# Patient Record
Sex: MALE | Race: WHITE | NOT HISPANIC OR LATINO | Employment: OTHER | URBAN - METROPOLITAN AREA
[De-identification: names, ages, dates, MRNs, and addresses within clinical notes are randomized per-mention and may not be internally consistent; named-entity substitution may affect disease eponyms.]

---

## 2017-12-01 PROBLEM — E89.0 POSTOPERATIVE HYPOTHYROIDISM: Status: ACTIVE | Noted: 2017-12-01

## 2017-12-01 PROBLEM — I10 HYPERTENSION, ESSENTIAL: Status: ACTIVE | Noted: 2017-12-01

## 2018-08-31 PROBLEM — S89.90XA INJURY OF KNEE: Status: ACTIVE | Noted: 2018-08-31

## 2018-08-31 PROBLEM — M25.569 KNEE PAIN: Status: ACTIVE | Noted: 2018-08-31

## 2018-08-31 PROBLEM — M19.019 ACROMIOCLAVICULAR JOINT ARTHRITIS: Status: ACTIVE | Noted: 2018-08-31

## 2018-08-31 PROBLEM — M17.9 OSTEOARTHRITIS OF KNEE: Status: ACTIVE | Noted: 2018-08-31

## 2018-08-31 PROBLEM — IMO0002 CURRENT TEAR OF MEDIAL CARTILAGE OR MENISCUS OF KNEE: Status: ACTIVE | Noted: 2018-08-31

## 2021-08-10 ENCOUNTER — TELEPHONE (OUTPATIENT)
Dept: FAMILY MEDICINE CLINIC | Facility: CLINIC | Age: 86
End: 2021-08-10

## 2021-08-10 NOTE — TELEPHONE ENCOUNTER
Spoke with this patient in detail. Advised him that I do not have any thing before the date that he was given. Pt is being placed on a wait list and if an appt comes avail he will be notified.

## 2021-08-10 NOTE — TELEPHONE ENCOUNTER
Caller: Florentin Mcnamara    Relationship to patient: Self    Best call back number: [phone]    Patient is needing: PATIENT IS OFFBOARDING DR. MIN. HE ASKED IF THERE IS A WAY HE COULD BE SEEN BY DR. HOLLY EARLIER THAN 12/6/21. HE SAID THAT HIS HEART SPECIALISTS IS REQUIRING HIM TO HAVE PRIMARY CARE PROVIDER BEFORE HE COULD SEE THEM AGAIN.     PLEASE ASSIST. PATIENT CAN BE REACHED [phone].

## 2021-08-14 PROBLEM — N28.9 RENAL INSUFFICIENCY: Status: ACTIVE | Noted: 2021-08-14

## 2021-08-17 PROBLEM — J44.9 COPD WITHOUT EXACERBATION: Status: ACTIVE | Noted: 2021-08-17

## 2021-09-02 ENCOUNTER — TELEPHONE (OUTPATIENT)
Dept: FAMILY MEDICINE CLINIC | Facility: CLINIC | Age: 86
End: 2021-09-02

## 2021-09-02 NOTE — TELEPHONE ENCOUNTER
Caller: [facility]    Relationship to patient: Other    Best call back number: [phone]    Patient is needing: MARK CALLED TO KNOW IF THE PATIENT HAS HAD A WALKING TEST TO MAKE SURE HE QUALIFIES FOR THE SUPPLIES. THEY SAID IT IS A REQUIREMENT FROM THE PATIENT'S INSURANCE. PLEASE CALL AND ADVISE.

## 2021-09-03 NOTE — TELEPHONE ENCOUNTER
Put it on your keyboard. Done 4/2021. He had desat to 87%. You can call them or fax it to them. Thanks.

## 2021-09-03 NOTE — TELEPHONE ENCOUNTER
Need fax number. If this facility calls back please obtain correct fax number.       Ok for [facility] to obtain.

## 2021-09-30 PROBLEM — J96.01 ACUTE RESPIRATORY FAILURE WITH HYPOXIA: Status: ACTIVE | Noted: 2021-09-30

## 2021-10-01 PROBLEM — J44.0 COPD WITH LOWER RESPIRATORY INFECTION: Status: ACTIVE | Noted: 2021-10-01

## 2022-06-17 ENCOUNTER — TELEPHONE (OUTPATIENT)
Dept: ONCOLOGY | Facility: CLINIC | Age: 87
End: 2022-06-17

## 2022-06-17 NOTE — TELEPHONE ENCOUNTER
Caller: Florentin Mcnamara    Relationship: Self    Best call back number: [phone]    What is the best time to reach you: ANYTIME    Who are you requesting to speak with (clinical staff, provider,  specific staff member): SCHEDULING    What was the call regarding: PT CALLED TO R/S THE SCANS HE MISSED - HIS CT SCANS AND US HAVE BEEN R/S FOR 6/21. PLEASE CALL PT TO R/S HIS LAB AND SCAN F/U APPT WITH DR VANCE.     Do you require a callback: YES

## 2023-05-25 ENCOUNTER — LAB (OUTPATIENT)
Dept: LAB | Facility: HOSPITAL | Age: 88
End: 2023-05-25
Payer: MEDICARE

## 2023-05-25 ENCOUNTER — HOSPITAL ENCOUNTER (OUTPATIENT)
Dept: CT IMAGING | Facility: HOSPITAL | Age: 88
Discharge: HOME | End: 2023-05-25
Payer: MEDICARE

## 2023-05-25 DIAGNOSIS — C77.9 SQUAMOUS CELL CARCINOMA OF LYMPH NODE: ICD-10-CM

## 2023-05-25 LAB
ALBUMIN SERPL-MCNC: 4.2 G/DL (ref 3.5–5.2)
ALBUMIN/GLOB SERPL: 1.6 G/DL
ALP SERPL-CCNC: 79 U/L (ref 39–117)
ALT SERPL W P-5'-P-CCNC: 33 U/L (ref 1–41)
ANION GAP SERPL CALCULATED.3IONS-SCNC: 10 MMOL/L (ref 5–15)
AST SERPL-CCNC: 44 U/L (ref 1–40)
BASOPHILS # BLD AUTO: 0.03 10*3/MM3 (ref 0–0.2)
BASOPHILS NFR BLD AUTO: 0.5 % (ref 0–1.5)
BILIRUB SERPL-MCNC: 0.9 MG/DL (ref 0–1.2)
BUN SERPL-MCNC: 26 MG/DL (ref 8–23)
BUN/CREAT SERPL: 18.2 (ref 7–25)
CALCIUM SPEC-SCNC: 8.9 MG/DL (ref 8.6–10.5)
CHLORIDE SERPL-SCNC: 105 MMOL/L (ref 98–107)
CO2 SERPL-SCNC: 24 MMOL/L (ref 22–29)
CREAT SERPL-MCNC: 1.43 MG/DL (ref 0.76–1.27)
DEPRECATED RDW RBC AUTO: 55.9 FL (ref 37–54)
EGFRCR SERPLBLD CKD-EPI 2021: 47.7 ML/MIN/1.73
EOSINOPHIL # BLD AUTO: 0.11 10*3/MM3 (ref 0–0.4)
EOSINOPHIL NFR BLD AUTO: 2 % (ref 0.3–6.2)
ERYTHROCYTE [DISTWIDTH] IN BLOOD BY AUTOMATED COUNT: 14.9 % (ref 12.3–15.4)
GLOBULIN UR ELPH-MCNC: 2.6 GM/DL
GLUCOSE SERPL-MCNC: 109 MG/DL (ref 65–99)
HCT VFR BLD AUTO: 37.3 % (ref 37.5–51)
HGB BLD-MCNC: 12.6 G/DL (ref 13–17.7)
IMM GRANULOCYTES # BLD AUTO: 0.02 10*3/MM3 (ref 0–0.05)
IMM GRANULOCYTES NFR BLD AUTO: 0.4 % (ref 0–0.5)
LYMPHOCYTES # BLD AUTO: 1.09 10*3/MM3 (ref 0.7–3.1)
LYMPHOCYTES NFR BLD AUTO: 19.3 % (ref 19.6–45.3)
MCH RBC QN AUTO: 34.3 PG (ref 26.6–33)
MCHC RBC AUTO-ENTMCNC: 33.8 G/DL (ref 31.5–35.7)
MCV RBC AUTO: 101.6 FL (ref 79–97)
MONOCYTES # BLD AUTO: 0.49 10*3/MM3 (ref 0.1–0.9)
MONOCYTES NFR BLD AUTO: 8.7 % (ref 5–12)
NEUTROPHILS NFR BLD AUTO: 3.9 10*3/MM3 (ref 1.7–7)
NEUTROPHILS NFR BLD AUTO: 69.1 % (ref 42.7–76)
NRBC BLD AUTO-RTO: 0 /100 WBC (ref 0–0.2)
PLAT MORPH BLD: NORMAL
PLATELET # BLD AUTO: 73 10*3/MM3 (ref 140–450)
PMV BLD AUTO: 11 FL (ref 6–12)
POTASSIUM SERPL-SCNC: 4.9 MMOL/L (ref 3.5–5.2)
PROT SERPL-MCNC: 6.8 G/DL (ref 6–8.5)
RBC # BLD AUTO: 3.67 10*6/MM3 (ref 4.14–5.8)
RBC MORPH BLD: NORMAL
SODIUM SERPL-SCNC: 139 MMOL/L (ref 136–145)
WBC MORPH BLD: NORMAL
WBC NRBC COR # BLD: 5.64 10*3/MM3 (ref 3.4–10.8)

## 2023-05-25 PROCEDURE — 0 DIATRIZOATE MEGLUMINE & SODIUM PER 1 ML: Performed by: INTERNAL MEDICINE

## 2023-05-25 PROCEDURE — 85007 BL SMEAR W/DIFF WBC COUNT: CPT

## 2023-05-25 PROCEDURE — 80053 COMPREHEN METABOLIC PANEL: CPT | Performed by: INTERNAL MEDICINE

## 2023-05-25 PROCEDURE — 25510000001 IOPAMIDOL 61 % SOLUTION: Performed by: INTERNAL MEDICINE

## 2023-05-25 PROCEDURE — 36415 COLL VENOUS BLD VENIPUNCTURE: CPT

## 2023-05-25 PROCEDURE — 70491 CT SOFT TISSUE NECK W/DYE: CPT

## 2023-05-25 PROCEDURE — 74177 CT ABD & PELVIS W/CONTRAST: CPT

## 2023-05-25 PROCEDURE — 82565 ASSAY OF CREATININE: CPT

## 2023-05-25 PROCEDURE — 71260 CT THORAX DX C+: CPT

## 2023-05-25 PROCEDURE — 85025 COMPLETE CBC W/AUTO DIFF WBC: CPT

## 2023-05-25 RX ADMIN — IOPAMIDOL 100 ML: 612 INJECTION, SOLUTION INTRAVENOUS at 11:32

## 2023-05-25 RX ADMIN — DIATRIZOATE MEGLUMINE AND DIATRIZOATE SODIUM 30 ML: 660; 100 LIQUID ORAL; RECTAL at 10:25

## 2023-05-26 LAB — CREAT BLDA-MCNC: 1.3 MG/DL (ref 0.6–1.3)

## 2023-05-26 RX ORDER — BUSPIRONE HYDROCHLORIDE 5 MG/1
TABLET ORAL
Qty: 60 TABLET | Refills: 1 | OUTPATIENT
Start: 2023-05-26

## 2023-05-30 RX ORDER — BUSPIRONE HYDROCHLORIDE 7.5 MG/1
TABLET ORAL
Qty: 90 TABLET | Refills: 0 | OUTPATIENT
Start: 2023-05-30

## 2023-05-31 NOTE — PROGRESS NOTES
.     REASON FOR FOLLOWUP :   Squamous cell carcinoma    HISTORY OF PRESENT ILLNESS:  The patient is a 88 y.o. year old male  who is here for follow-up with the above-mentioned history.    He fell recently and has a flare of his sciatica.   Per his request, this was a telephone visit instead of an in office visit.    No new problems.  States he had been on oral iron for about a year but self stopped this a month ago just because he ran out of the tablets.  Iron labs are robust today despite some mild anemia.  Therefore, he will remain off oral iron.    No new problems.  No bleeding.  Planning to move to [place] in about a week    Past Medical History:   Diagnosis Date   • Abdominal aneurysm     CT 6/2019, Dr Mcdaniel following   • Anemia    • Arthritis    • Asthma    • CHF (congestive heart failure)    • COPD (chronic obstructive pulmonary disease)    • DUMONT (dyspnea on exertion)    • Essential tremor    • GERD (gastroesophageal reflux disease)    • H/O asbestosis    • Heartburn    • Hyperlipidemia     not on therapy for several months   • Hypertension    • Hypothyroidism, postsurgical    • Insomnia    • Obesity    • Occasional tremors    • On supplemental oxygen therapy     2L NC @ HS PRN    • Pneumonia due to COVID-19 virus 10/2021   • Squamous cell carcinoma of lymph node    • Thrombocytopenia 10/02/2021   • Thyroid nodule     benign and has partial thyroidectomy   • Tremor      Past Surgical History:   Procedure Laterality Date   • ARTERIOGRAM AORTIC N/A 4/8/2021    Procedure: ZENITH AORTIC STENT GRAFT;  Surgeon: Yudy Mcdaniel Jr., MD;  Location: [facility] OR 18/19;  Service: Vascular;  Laterality: N/A;   • BACK SURGERY      2 surgeries   • CHOLECYSTECTOMY     • COLONOSCOPY N/A 2010   • EXCISION MASS ARM Right 12/24/2021    Procedure: EXCISION MASS UPPER EXTREMITY/AXILLA;  Surgeon: Jean Troy MD;  Location: [facility] OR [facility];  Service: General;  Laterality: Right;   • LYMPH NODE BIOPSY Right 2021     rt axilla   • REPLACEMENT TOTAL KNEE Right    • THYROIDECTOMY, PARTIAL     • TOTAL KNEE ARTHROPLASTY Left 10/23/2019    Procedure: TOTAL KNEE ARTHROPLASTY AND ALL ASSOCIATED PROCEDURES;  Surgeon: Gerardo Watts MD;  Location: [facility];  Service: Orthopedics       MEDICATIONS    Current Outpatient Medications:   •  acetaminophen-codeine (TYLENOL #4) 300-60 MG per tablet, Take 1 tablet by mouth Daily As Needed for Moderate Pain. PRN, Disp: 90 tablet, Rfl: 0  •  albuterol sulfate  (90 Base) MCG/ACT inhaler, Inhale 2 puffs Every 4 (Four) Hours As Needed for Wheezing., Disp: 18 g, Rfl: 2  •  busPIRone (BUSPAR) 7.5 MG tablet, Take 1 tablet by mouth 3 (Three) Times a Day., Disp: 90 tablet, Rfl: 0  •  celecoxib (CeleBREX) 200 MG capsule, TAKE 1 CAPSULE BY MOUTH DAILY, Disp: 90 capsule, Rfl: 1  •  ciprofloxacin (CIPRO) 500 MG tablet, Take 1 tablet by mouth 2 (Two) Times a Day., Disp: 14 tablet, Rfl: 0  •  fluticasone (FLONASE) 50 MCG/ACT nasal spray, USE 2 SPRAYS IN EACH NOSTRIL DAILY AS DIRECTED, Disp: 16 g, Rfl: 3  •  ipratropium-albuterol (DUO-NEB) 0.5-2.5 mg/3 ml nebulizer, Take 3 mL by nebulization Every 4 (Four) Hours As Needed for Wheezing or Shortness of Air., Disp: 360 mL, Rfl: 1  •  levothyroxine (SYNTHROID, LEVOTHROID) 88 MCG tablet, TAKE 1 TABLET BY MOUTH DAILY, Disp: 90 tablet, Rfl: 3  •  lisinopril (PRINIVIL,ZESTRIL) 20 MG tablet, TAKE 1 TABLET BY MOUTH DAILY, Disp: 90 tablet, Rfl: 1  •  meclizine (ANTIVERT) 12.5 MG tablet, Take 1 tablet by mouth 3 (Three) Times a Day As Needed for Dizziness., Disp: 60 tablet, Rfl: 0  •  metroNIDAZOLE (METROGEL) 0.75 % gel, Apply  topically to the appropriate area as directed 2 (Two) Times a Day., Disp: 45 g, Rfl: 1  •  O2 (OXYGEN), Inhale 2 L/min Continuous. Using nightly only on concentrator, Disp: , Rfl:   •  omeprazole (priLOSEC) 40 MG capsule, Take 1 capsule by mouth Daily., Disp: 90 capsule, Rfl: 3  •  propranolol (INDERAL) 10 MG tablet, Take 1 tablet by  "mouth 2 (Two) Times a Day., Disp: 90 tablet, Rfl: 1  •  spironolactone (ALDACTONE) 25 MG tablet, Take 0.5 tablets by mouth Daily., Disp: 30 tablet, Rfl: 11  •  Stiolto Respimat 2.5-2.5 MCG/ACT aerosol solution inhaler, INHALE 2 PUFFS BY MOUTH DAILY, Disp: 4 g, Rfl: 3    ALLERGIES:     Allergies   Allergen Reactions   • Percocet [Oxycodone-Acetaminophen] Hallucinations       SOCIAL HISTORY:       Social History     Socioeconomic History   • Marital status:      Spouse name: Denisse   Tobacco Use   • Smoking status: Former     Packs/day: 1.00     Years: 20.00     Pack years: 20.00     Types: Cigarettes     Quit date: 9/10/1989     Years since quittin.7   • Smokeless tobacco: Never   Vaping Use   • Vaping Use: Never used   Substance and Sexual Activity   • Alcohol use: Not Currently     Comment: ocassionally   • Drug use: No   • Sexual activity: Not Currently     Partners: Female     Comment: I'm 87         FAMILY HISTORY:  Family History   Problem Relation Age of Onset   • Heart disease Mother    • Stroke Father    • Lung cancer Brother 80   • Malig Hyperthermia Neg Hx        REVIEW OF SYSTEMS:  Review of Systems   Constitutional: Negative for activity change.   HENT: Negative for nosebleeds and trouble swallowing.    Respiratory: Negative for shortness of breath and wheezing.    Cardiovascular: Negative for chest pain and palpitations.   Gastrointestinal: Negative for constipation, diarrhea and nausea.   Genitourinary: Negative for dysuria and hematuria.   Musculoskeletal: Negative for arthralgias and myalgias.   Neurological: Negative for seizures and syncope.   Hematological: Negative for adenopathy. Does not bruise/bleed easily.   Psychiatric/Behavioral: Negative for confusion.              Vitals:    23 1243   BP: 123/77   Pulse: 71   Resp: 16   Temp: 97.8 °F (36.6 °C)   TempSrc: Temporal   SpO2: 97%   Weight: 117 kg (258 lb 8 oz)   Height: 185 cm (72.84\")   PainSc: 0-No pain         2023    " 12:44 PM   Current Status   ECOG score 0      PHYSICAL EXAM:        CONSTITUTIONAL:  Vital signs reviewed.  No distress, looks comfortable.  EYES:  Conjunctiva and lids unremarkable.  PERRLA  EARS,NOSE,MOUTH,THROAT:  Ears and nose appear unremarkable.  Lips, teeth, gums appear unremarkable.  RESPIRATORY:  Normal respiratory effort.  Lungs clear to auscultation bilaterally.  CARDIOVASCULAR:  Normal S1, S2.  No murmurs rubs or gallops.  No significant lower extremity edema.  GASTROINTESTINAL: Abdomen appears unremarkable.  Nontender.  No hepatomegaly.  No splenomegaly.  LYMPHATIC:  No cervical, supraclavicular, axillary lymphadenopathy.  SKIN:  Warm.  No rashes.  PSYCHIATRIC:  Normal judgment and insight.  Normal mood and affect.            RECENT LABS:        WBC   Date Value Ref Range Status   05/25/2023 5.64 3.40 - 10.80 10*3/mm3 Final   05/02/2023 6.65 3.40 - 10.80 10*3/mm3 Final   03/20/2023 5.13 3.40 - 10.80 10*3/mm3 Final   12/27/2022 4.52 3.40 - 10.80 10*3/mm3 Final   09/30/2022 5.76 3.40 - 10.80 10*3/mm3 Final   06/24/2022 3.59 3.40 - 10.80 10*3/mm3 Final   03/25/2022 3.28 (L) 3.40 - 10.80 10*3/mm3 Final   12/03/2021 4.74 3.40 - 10.80 10*3/mm3 Final   10/04/2021 4.11 3.40 - 10.80 10*3/mm3 Final   10/03/2021 7.64 3.40 - 10.80 10*3/mm3 Final   10/02/2021 6.43 3.40 - 10.80 10*3/mm3 Final   10/01/2021 2.24 (L) 3.40 - 10.80 10*3/mm3 Final   09/30/2021 5.34 3.40 - 10.80 10*3/mm3 Final   06/30/2021 6.37 3.40 - 10.80 10*3/mm3 Final   04/23/2021 7.52 3.40 - 10.80 10*3/mm3 Final   04/11/2021 9.48 3.40 - 10.80 10*3/mm3 Final   04/10/2021 9.16 3.40 - 10.80 10*3/mm3 Final   04/09/2021 8.57 3.40 - 10.80 10*3/mm3 Final   03/31/2021 4.87 3.40 - 10.80 10*3/mm3 Final   11/16/2020 6.4 3.4 - 10.8 x10E3/uL Final   10/23/2020 4.04 3.40 - 10.80 10*3/mm3 Final   01/24/2020 6.61 3.40 - 10.80 10*3/mm3 Final   10/24/2019 12.76 (H) 3.40 - 10.80 10*3/mm3 Final   10/23/2019 5.15 3.40 - 10.80 10*3/mm3 Final   10/15/2019 4.91 3.40 - 10.80  10*3/mm3 Final   08/31/2018 5.64 4.50 - 10.70 10*3/mm3 Final   03/01/2018 5.78 4.50 - 10.70 10*3/mm3 Final     Hemoglobin   Date Value Ref Range Status   05/25/2023 12.6 (L) 13.0 - 17.7 g/dL Final   05/02/2023 13.4 13.0 - 17.7 g/dL Final   03/20/2023 14.4 13.0 - 17.7 g/dL Final   12/27/2022 14.1 13.0 - 17.7 g/dL Final   09/30/2022 13.9 13.0 - 17.7 g/dL Final   06/24/2022 12.8 (L) 13.0 - 17.7 g/dL Final   03/25/2022 13.5 13.0 - 17.7 g/dL Final   12/03/2021 13.0 13.0 - 17.7 g/dL Final   10/04/2021 12.7 (L) 13.0 - 17.7 g/dL Final   10/03/2021 13.8 13.0 - 17.7 g/dL Final   10/02/2021 13.9 13.0 - 17.7 g/dL Final   10/01/2021 13.4 13.0 - 17.7 g/dL Final   09/30/2021 14.8 13.0 - 17.7 g/dL Final   06/30/2021 11.5 (L) 13.0 - 17.7 g/dL Final   04/23/2021 11.2 (L) 13.0 - 17.7 g/dL Final   04/11/2021 9.1 (L) 13.0 - 17.7 g/dL Final   04/10/2021 8.7 (L) 13.0 - 17.7 g/dL Final   04/09/2021 10.3 (L) 13.0 - 17.7 g/dL Final   04/09/2021 9.2 (L) 13.0 - 17.7 g/dL Final   04/09/2021 10.0 (L) 13.0 - 17.7 g/dL Final   04/08/2021 11.2 (L) 12.0 - 17.0 g/dL Final   03/31/2021 13.7 13.0 - 17.7 g/dL Final   11/16/2020 14.2 13.0 - 17.7 g/dL Final   10/23/2020 13.8 13.0 - 17.7 g/dL Final   01/24/2020 13.5 13.0 - 17.7 g/dL Final   10/24/2019 13.2 13.0 - 17.7 g/dL Final   10/23/2019 14.0 13.0 - 17.7 g/dL Final   10/15/2019 14.2 13.0 - 17.7 g/dL Final   08/31/2018 14.0 13.7 - 17.6 g/dL Final   03/01/2018 14.7 13.7 - 17.6 g/dL Final     Platelets   Date Value Ref Range Status   05/25/2023 73 (L) 140 - 450 10*3/mm3 Final   05/02/2023 76 (L) 140 - 450 10*3/mm3 Final   03/20/2023 55 (L) 140 - 450 10*3/mm3 Final   12/27/2022 58 (L) 140 - 450 10*3/mm3 Final   09/30/2022 70 (L) 140 - 450 10*3/mm3 Final   06/24/2022 63 (L) 140 - 450 10*3/mm3 Final   03/25/2022 58 (L) 140 - 450 10*3/mm3 Final   12/03/2021 76 (L) 140 - 450 10*3/mm3 Final   12/03/2021 82 (L) 140 - 450 10*3/mm3 Final   10/04/2021 92 (L) 140 - 450 10*3/mm3 Final   10/03/2021 118 (L) 140 - 450  10*3/mm3 Final   10/02/2021 106 (L) 140 - 450 10*3/mm3 Final   10/01/2021 71 (L) 140 - 450 10*3/mm3 Final   09/30/2021 83 (L) 140 - 450 10*3/mm3 Final   06/30/2021 97 (L) 140 - 450 10*3/mm3 Final   04/23/2021 190 140 - 450 10*3/mm3 Final   04/11/2021 62 (L) 140 - 450 10*3/mm3 Final   04/10/2021 59 (L) 140 - 450 10*3/mm3 Final   04/09/2021 62 (L) 140 - 450 10*3/mm3 Final   03/31/2021 74 (L) 140 - 450 10*3/mm3 Final   11/16/2020 103 (L) 150 - 450 x10E3/uL Final   10/23/2020 91 (L) 140 - 450 10*3/mm3 Final   01/24/2020 101 (L) 140 - 450 10*3/mm3 Final   10/24/2019 113 (L) 140 - 450 10*3/mm3 Final   10/23/2019 81 (L) 140 - 450 10*3/mm3 Final   10/15/2019 95 (L) 140 - 450 10*3/mm3 Final   08/31/2018 92 (L) 140 - 500 10*3/mm3 Final   03/01/2018 104 (L) 140 - 500 10*3/mm3 Final       Assessment & Plan   Squamous cell carcinoma of lymph node  - Ferritin  - Retic With IRF & RET-He  - Iron Profile        Ignacio PINA Anders   *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  (Cancer type ID: Head and neck/skin primary, 90% probability)  · Admitted 9/30/2021-10/4/2021 for Covid pneumonia and respiratory failure.  · During the admission, CT chest angiogram, 9/30/2021 incidentally revealed a right axillary/infra axillary soft tissue mass and potential neoplasm 2.5 x 4.4 cm.  Radiologist noted this could represent a hematoma but imaging findings were nonspecific and no clear evidence of internal fluid.  Recommended considering biopsy.  Noted 2.2 cm low-density right lobe liver lesion, unchanged from 10/23/2020.  · Follow-up CT chest and CT angiogram AP (to follow-up AAA), 10/27/2021: Right axilla enlarging lobulated soft tissue mass.  4.5 x 2.3 x 2.9 cm, from 4.4 x 1.6 x 2.5 cm.  Suspicious for malignancy.  No concerning abnormalities noted elsewhere including the liver.  · Right axillary mass biopsy, 11/17/2021: Moderately differentiated keratinizing squamous cell carcinoma.  Pathologist noted no definite lymph  node architecture seen in the sample.  Pathologist stated correlate clinically to determine if this represents a completely replaced lymph node by metastatic carcinoma or direct extension from an overlying skin primary.  · PET 12/10/2021: Right axilla 6 x 4 cm hypermetabolic mass.  Asymmetric right nasopharyngeal activity without a corresponding CT abnormality.  No cervical LAD.  Radiologist stated although this may be of no clinical significance, recommended direct visualization  · Cancer type ID on right axillary node: Squamous cell carcinoma of head and neck/skin primary, 90% probability.  Urinary bladder 6% part probability.  Lung squamous cell carcinoma <5% probability.  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  Completed 5-FU cream x3 weeks January 2021.  · 12/8/2021, Dr. Lucina Ricketts, dermatology skin exam: Negative  · 12/17/2021 Dr. Reagan Norman, ENT exam: Negative for a head and neck primary (and would not expect a head and neck primary to cause axillary LAD)  · Since no additional disease found on PET, referral to surgery for consideration of axillary dissection and referral to radiation medicine to see if he would benefit from XRT after surgery  ·  Goal of cure.  Not planning systemic therapy.  · 12/24/2021: Right axillary mass excision, Dr. Troy: 5.2 cm moderately differentiated keratinizing squamous cell carcinoma with extranodal extension.  Malignancy focally extends to the peripheral inked margin of excision.  · Right axillary XRT, Dr. Olvera, 2/17/2022-4/4/2022.  · CT NCAP, 3/18/2022: No evidence of recurrence.  Does show a thick-walled partially decompressed right axillary collection, 4.9 x 1.4 cm at the site of resection.  Radiologist states this could be followed on subsequent exams.  · CT 6/21/2022: No evidence of malignancy.  · CT 9/23/2022: No evidence of recurrence.   · CT 12/27/2022: No evidence of recurrence  · CT 3/20/2023: No signs of  recurrence  · CT 5/25/2023: No evidence of recurrence  · Continuing on observation    *1.9 cm thyroid right lobe nodule on CT 3/18/2022, from 1.5 cm on PET [facility]10/2021 [facility]  · CT 9/23/2022: Unchanged right thyroid nodule  · CT 3/20/2023: No mention of a right thyroid nodule.  · 3/24/2023: He states Dr. Norman's office did not make him a follow-up.  I asked him to call Dr. Norman's office and see if Dr. Norman is planning to still follow him.  He states he will do this.    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 Patient states he does not have a formal diagnosis of this. States his PCP tried to send him to GI in the past but the appointment did not work out.  · 12/3/2021: He declines referral to GI    *Anemia, intermittent  · Occasionally has mild anemia.  · Hb 12.6.  On 6/1/2023 ferritin 266, 46% saturation.  States he has been on oral iron daily for about 1 year but self stopped this early May 2023 because he ran out of the tablets.  Because his iron is robust I told him to remain off oral iron.  He states he will be following up with hematology oncology when he establishes care after moving to [place] in 1 week.  I told him I suspect they will periodically check his iron to see if he develops iron deficiency after being off oral iron for a while.    *Class 2 obesity. This can lead to Vee which can lead to cirrhosis which can lead to cytopenias including thrombocytopenia.  Body mass index is 34.26 kg/m².  BMI 25 to <30 is overweight  BMI 30 to <35 is class 1 obesity  BMI 35 to <40 is class 2 obesity  BMI 40 or higher is class 3 obesity   Remaining overweight.  Ideally, lose  weight    Plan  No scheduled follow-up here as he is moving to [place] in 1 week.  He will be moving in with his son who is a physician and his son is going to get him established with a hematologist/oncologist in [place]    Typical plan has been:  · MD 3 months.  1 week prior: CT NCAP without contrast, CBC, CMP    · Not planning any systemic therapy  · Plan to follow with CT NCAP without contrast every 3 months for now (including neck because head and neck primary was part of the differential on cancer type ID.)

## 2023-06-01 ENCOUNTER — LAB (OUTPATIENT)
Dept: OTHER | Facility: HOSPITAL | Age: 88
End: 2023-06-01
Payer: MEDICARE

## 2023-06-01 ENCOUNTER — OFFICE VISIT (OUTPATIENT)
Dept: ONCOLOGY | Facility: CLINIC | Age: 88
End: 2023-06-01

## 2023-06-01 VITALS
TEMPERATURE: 97.8 F | DIASTOLIC BLOOD PRESSURE: 77 MMHG | BODY MASS INDEX: 34.26 KG/M2 | HEIGHT: 73 IN | RESPIRATION RATE: 16 BRPM | HEART RATE: 71 BPM | OXYGEN SATURATION: 97 % | SYSTOLIC BLOOD PRESSURE: 123 MMHG | WEIGHT: 258.5 LBS

## 2023-06-01 DIAGNOSIS — C77.9 SQUAMOUS CELL CARCINOMA OF LYMPH NODE: Primary | ICD-10-CM

## 2023-06-01 LAB
FERRITIN SERPL-MCNC: 266.1 NG/ML (ref 30–400)
HGB RETIC QN AUTO: 36.4 PG (ref 29.8–36.1)
IMM RETICS NFR: 19 % (ref 3–15.8)
IRON 24H UR-MRATE: 161 MCG/DL (ref 59–158)
IRON SATN MFR SERPL: 46 % (ref 20–50)
RETICS # AUTO: 0.1 10*6/MM3 (ref 0.02–0.13)
RETICS/RBC NFR AUTO: 2.55 % (ref 0.7–1.9)
TIBC SERPL-MCNC: 349 MCG/DL (ref 298–536)
TRANSFERRIN SERPL-MCNC: 234 MG/DL (ref 200–360)

## 2023-06-01 PROCEDURE — 36415 COLL VENOUS BLD VENIPUNCTURE: CPT | Performed by: INTERNAL MEDICINE

## 2023-06-01 PROCEDURE — 99214 OFFICE O/P EST MOD 30 MIN: CPT | Performed by: INTERNAL MEDICINE

## 2023-06-01 PROCEDURE — 85046 RETICYTE/HGB CONCENTRATE: CPT | Performed by: INTERNAL MEDICINE

## 2023-06-01 PROCEDURE — 1126F AMNT PAIN NOTED NONE PRSNT: CPT | Performed by: INTERNAL MEDICINE

## 2023-06-01 PROCEDURE — 84466 ASSAY OF TRANSFERRIN: CPT | Performed by: INTERNAL MEDICINE

## 2023-06-01 PROCEDURE — 82728 ASSAY OF FERRITIN: CPT | Performed by: INTERNAL MEDICINE

## 2023-06-01 PROCEDURE — 83540 ASSAY OF IRON: CPT | Performed by: INTERNAL MEDICINE
